# Patient Record
Sex: FEMALE | Race: OTHER | NOT HISPANIC OR LATINO
[De-identification: names, ages, dates, MRNs, and addresses within clinical notes are randomized per-mention and may not be internally consistent; named-entity substitution may affect disease eponyms.]

---

## 2020-02-05 PROBLEM — Z00.00 ENCOUNTER FOR PREVENTIVE HEALTH EXAMINATION: Status: ACTIVE | Noted: 2020-02-05

## 2020-03-03 ENCOUNTER — APPOINTMENT (OUTPATIENT)
Dept: CARDIOLOGY | Facility: CLINIC | Age: 50
End: 2020-03-03

## 2022-09-02 ENCOUNTER — APPOINTMENT (OUTPATIENT)
Dept: OBGYN | Facility: CLINIC | Age: 52
End: 2022-09-02

## 2022-09-02 VITALS
HEIGHT: 65 IN | DIASTOLIC BLOOD PRESSURE: 74 MMHG | SYSTOLIC BLOOD PRESSURE: 120 MMHG | BODY MASS INDEX: 36.32 KG/M2 | WEIGHT: 218 LBS

## 2022-09-02 DIAGNOSIS — N83.202 UNSPECIFIED OVARIAN CYST, LEFT SIDE: ICD-10-CM

## 2022-09-02 DIAGNOSIS — N83.201 UNSPECIFIED OVARIAN CYST, RIGHT SIDE: ICD-10-CM

## 2022-09-02 DIAGNOSIS — Z01.411 ENCOUNTER FOR GYNECOLOGICAL EXAMINATION (GENERAL) (ROUTINE) WITH ABNORMAL FINDINGS: ICD-10-CM

## 2022-09-02 DIAGNOSIS — R93.89 ABNORMAL FINDINGS ON DIAGNOSTIC IMAGING OF OTHER SPECIFIED BODY STRUCTURES: ICD-10-CM

## 2022-09-02 PROCEDURE — 99213 OFFICE O/P EST LOW 20 MIN: CPT | Mod: 25

## 2022-09-02 PROCEDURE — 76830 TRANSVAGINAL US NON-OB: CPT

## 2022-09-02 PROCEDURE — 99386 PREV VISIT NEW AGE 40-64: CPT

## 2022-09-02 NOTE — HISTORY OF PRESENT ILLNESS
[FreeTextEntry1] : Patient is 52 years old para 5-0-5-3 last menstrual period December 20, 2018.\par She denies postmenopausal bleeding.  Patient was evaluated at our [facility] emergency department on August 24, 2022 secondary to pelvic pain.  Pelvic ultrasound revealed thickened endometrium and bilateral ovarian cysts.\par Her last Pap was in 2018 and last mammogram in 2018

## 2022-09-02 NOTE — PROCEDURE
[Cervical Pap Smear] : cervical Pap smear [Liquid Base] : liquid base [Endometrial Biopsy] : Endometrial biopsy [Time out performed] : Pre-procedure time out performed.  Patient's name, date of birth and procedure confirmed. [Consent Obtained] : Consent obtained [Thickened Endometrium] : thickened endometrium [Risks] : risks [Benefits] : benefits [Alternatives] : alternatives [Patient] : patient [Infection] : infection [Bleeding] : bleeding [Allergic Reaction] : allergic reaction [Uterine Perforation] : uterine perforation [Pain] : pain [Negative] : negative pregnancy test [No Premedication] : No premedication [Betadine] : Betadine [None] : none [Tenaculum] : Tenaculum [Easy Passage] : Easy passage [Sounded to ___ cm] : sounded to [unfilled] ~Ucm [Anteverted] : anteverted [Scant] : scant [Sent to Pathology] : placed in buffered formalin and sent for pathology [Tolerated Well] : Patient tolerated the procedure well [No Complications] : No complications [LMPDate] : 12/20/2018

## 2022-09-02 NOTE — DISCUSSION/SUMMARY
[FreeTextEntry1] : Pap done\par Self breast exam stressed\par Prescribed bilateral screening mammogram\par Prescribed hormone profile with  and CEA\par Prescribed follow-up pelvic ultrasound\par Endometrial sampling performed without difficulty or complications\par Advised gastrointestinal evaluation\par Follow-up 6 months or as needed

## 2024-05-20 ENCOUNTER — APPOINTMENT (OUTPATIENT)
Dept: OBGYN | Facility: CLINIC | Age: 54
End: 2024-05-20
Payer: COMMERCIAL

## 2024-05-20 ENCOUNTER — NON-APPOINTMENT (OUTPATIENT)
Age: 54
End: 2024-05-20

## 2024-05-20 DIAGNOSIS — Z01.419 ENCOUNTER FOR GYNECOLOGICAL EXAMINATION (GENERAL) (ROUTINE) W/OUT ABNORMAL FINDINGS: ICD-10-CM

## 2024-05-20 PROCEDURE — 99203 OFFICE O/P NEW LOW 30 MIN: CPT | Mod: 25

## 2024-05-20 PROCEDURE — 99386 PREV VISIT NEW AGE 40-64: CPT

## 2024-05-20 NOTE — HISTORY OF PRESENT ILLNESS
[FreeTextEntry1] : ----53 Y/O P5 HERE FOR CHECK UP. PMHX;/ PSHX;/ SOCIAL HX;-ETOH -CIGG  STD;   /        STD PREVENTION DISCUSSED FAMILY HISTORY OF BREAST CANCER;NO REVIEW OF SYMPTOMS DONE; ALLERGIES; pt answered NKDA Medication reconciliation was completed by reviewing, with the patient's involvement, the patient's current outpatient medications and those  ordered for the patient today.           PE;BREASTS;- MASSES DC NODES SBE ABDOMEN;SOFT NT ND NL GENITALIA VAGINA -DC CERVIX;-CMT UTERUS;NL SIZE NT AV ADNEXA; LEFT TENDERNESS TO PALPATION- MASSES   A;P;ANNUAL EXAM, PELVIC PAIN -PAP GC CHLAMYDIA -SANJUANA -SONO -F-U AFTER ABOVE.

## 2024-05-22 LAB
CYTOLOGY CVX/VAG DOC THIN PREP: NORMAL
HPV HIGH+LOW RISK DNA PNL CVX: NOT DETECTED

## 2024-05-23 ENCOUNTER — APPOINTMENT (OUTPATIENT)
Dept: OBGYN | Facility: CLINIC | Age: 54
End: 2024-05-23
Payer: COMMERCIAL

## 2024-05-23 DIAGNOSIS — R10.2 PELVIC AND PERINEAL PAIN: ICD-10-CM

## 2024-05-23 PROCEDURE — 99213 OFFICE O/P EST LOW 20 MIN: CPT

## 2024-05-23 PROCEDURE — 76830 TRANSVAGINAL US NON-OB: CPT

## 2024-05-23 NOTE — HISTORY OF PRESENT ILLNESS
[FreeTextEntry1] : ----55 Y/O P5 HERE PELVIC PAIN;MILD SONO TODAY;DISCUSSED FIBROIDS. PMHX;/ PSHX;/ SOCIAL HX;-ETOH -CIGG  STD;   /        STD PREVENTION DISCUSSED FAMILY HISTORY OF BREAST CANCER;NO REVIEW OF SYMPTOMS DONE; ALLERGIES; pt answered NKDA Medication reconciliation was completed by reviewing, with the patient's involvement, the patient's current outpatient medications and those  ordered for the patient today.           PE; ABDOMEN;SOFT NT ND EXT -CCE  A;P;FIBROID UTERUS,PELVIC PAIN MOTRIN PRN -SONO REVIEWED -REASSURED PT -F-U PRN.